# Patient Record
Sex: FEMALE | Race: ASIAN | Employment: STUDENT | URBAN - METROPOLITAN AREA
[De-identification: names, ages, dates, MRNs, and addresses within clinical notes are randomized per-mention and may not be internally consistent; named-entity substitution may affect disease eponyms.]

---
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
## 2018-01-03 PROBLEM — M24.521 CONTRACTURE OF RIGHT ELBOW: Status: ACTIVE | Noted: 2018-01-03

## 2018-02-04 ENCOUNTER — HOSPITAL ENCOUNTER (EMERGENCY)
Facility: HOSPITAL | Age: 18
Discharge: HOME | End: 2018-02-04
Attending: EMERGENCY MEDICINE
Payer: COMMERCIAL

## 2018-02-04 VITALS
BODY MASS INDEX: 19 KG/M2 | DIASTOLIC BLOOD PRESSURE: 86 MMHG | SYSTOLIC BLOOD PRESSURE: 102 MMHG | HEART RATE: 86 BPM | WEIGHT: 105.38 LBS | OXYGEN SATURATION: 98 % | TEMPERATURE: 97 F | RESPIRATION RATE: 16 BRPM

## 2018-02-04 DIAGNOSIS — L30.9 ECZEMA, UNSPECIFIED TYPE: ICD-10-CM

## 2018-02-04 DIAGNOSIS — R21 RASH AND NONSPECIFIC SKIN ERUPTION: Primary | ICD-10-CM

## 2018-02-04 PROCEDURE — 99283 EMERGENCY DEPT VISIT LOW MDM: CPT

## 2018-02-04 RX ORDER — NAPROXEN 500 MG/1
500 TABLET ORAL
COMMUNITY
End: 2018-06-06

## 2018-02-04 RX ORDER — CEPHALEXIN 500 MG/1
500 CAPSULE ORAL 3 TIMES DAILY
Qty: 30 CAPSULE | Refills: 0 | Status: SHIPPED | OUTPATIENT
Start: 2018-02-04 | End: 2018-02-14

## 2018-02-04 NOTE — ED INITIAL ASSESSMENT (HPI)
Itchy rash started yesterday to right arm, spreading to right side of neck post right elbow synovectomy and contracture on January 25. Per patient, incision sites have itched since day of procedure.

## 2018-02-04 NOTE — ED INITIAL ASSESSMENT (HPI)
Itchy rash started yesterday to right arm, up to right side of neck. Had procedure Jan 25 on right arm - incision sites have itched since day of procedure.

## 2018-02-05 NOTE — ED PROVIDER NOTES
Patient Seen in: [facility] Emergency Department    History   Patient presents with:  Rash Skin Problem (integumentary)    Stated Complaint: rash around surgical site- right elbow arthroscopy    HPI    Paris Ours is a 57-year-old with history of juvenil NECK: Neck is supple. CHEST: Patient is breathing comfortably. Lungs are clear to auscultation bilaterally. No wheezes, rhonchi or rales. HEART: Regular rate and rhythm, S1-S2, no rubs or murmurs.   ABDOMEN: Soft, nontender, nondistended with good b as soon as possible for a visit in 2 days  If symptoms worsen        Medications Prescribed:  Discharge Medication List as of 2/4/2018 12:49 PM    START taking these medications    cephALEXin 500 MG Oral Cap  Take 1 capsule (500 mg total) by mouth 3 (three

## 2018-02-07 ENCOUNTER — HOSPITAL ENCOUNTER (OUTPATIENT)
Dept: PHYSICAL THERAPY | Facility: HOSPITAL | Age: 18
Setting detail: THERAPIES SERIES
Discharge: HOME | End: 2018-02-07
Attending: PHYSICIAN ASSISTANT
Payer: COMMERCIAL

## 2018-02-07 DIAGNOSIS — M25.621 ELBOW STIFFNESS, RIGHT: ICD-10-CM

## 2018-02-07 DIAGNOSIS — M24.521 CONTRACTURE OF RIGHT ELBOW: ICD-10-CM

## 2018-02-07 DIAGNOSIS — M05.79 RHEUMATOID ARTHRITIS INVOLVING MULTIPLE SITES WITH POSITIVE RHEUMATOID FACTOR (HCC): ICD-10-CM

## 2018-02-07 PROCEDURE — 97110 THERAPEUTIC EXERCISES: CPT

## 2018-02-07 PROCEDURE — 97162 PT EVAL MOD COMPLEX 30 MIN: CPT

## 2018-02-07 NOTE — PROGRESS NOTES
UPPER EXTREMITY EVALUATION:   Referring Physician: Dr. Tala Escobedo  Diagnosis: Contracture of R elbow Date of Service: 2/7/2018     PATIENT SUMMARY   Spencer Brady is a 21year old y/o female who presents to therapy today with complaints of R elbow ROM [place] use LUE for these activities. Patient has been unable to drive since 17 due to RUE ROM limitations. Patient currently is a college student.   Pt goals include improving R elbow flexion ROM to assist with dressing, showering, eating, writing, lifting objec 60, L 30  Pronation: R 45, L 60    *pain at end range in medial and posterior elbow Flexion: R 80; L 55*  Extension: R 43; L 10**  Radial deviation: R 15; L 3*  Ulnar deviation: R 65; L 40**  *pain dorsal surface wrist  **pain volar surface wrist     PROM: Therapeutic Exercises; Neuromuscular Re-education; Therapeutic Activity; Electrical Stim; Ultrasound;  Pt education; Home exercise program instructions;     Education or treatment limitation: None  Rehab Potential:good    FOTO: 44/100    Patient/Family/Care

## 2018-02-12 ENCOUNTER — HOSPITAL ENCOUNTER (OUTPATIENT)
Dept: PHYSICAL THERAPY | Facility: HOSPITAL | Age: 18
Setting detail: THERAPIES SERIES
Discharge: HOME | End: 2018-02-12
Attending: PHYSICIAN ASSISTANT
Payer: COMMERCIAL

## 2018-02-12 PROCEDURE — 97140 MANUAL THERAPY 1/> REGIONS: CPT

## 2018-02-12 PROCEDURE — 97110 THERAPEUTIC EXERCISES: CPT

## 2018-02-12 NOTE — PROGRESS NOTES
Dx: Contracture of right elbow         Authorized # of Visits:  8         Next MD visit: none scheduled  Fall Risk: standard         Precautions: n/a             Subjective: Patient complains no no R elbow pain at rest.  States that the exercises are going (10 visits)  -Pt will demonstrate understanding, knowledge, and independence in current and future HEP (10 visits)    Plan: Continue current PT plan of care 2x/week to address R elbow A/PROM deficits, gradually start increasing R elbow strength as tolerate

## 2018-02-14 ENCOUNTER — HOSPITAL ENCOUNTER (OUTPATIENT)
Dept: PHYSICAL THERAPY | Facility: HOSPITAL | Age: 18
Setting detail: THERAPIES SERIES
Discharge: HOME | End: 2018-02-14
Attending: PHYSICIAN ASSISTANT
Payer: COMMERCIAL

## 2018-02-14 PROCEDURE — 97530 THERAPEUTIC ACTIVITIES: CPT

## 2018-02-14 PROCEDURE — 97140 MANUAL THERAPY 1/> REGIONS: CPT

## 2018-02-14 NOTE — PROGRESS NOTES
Dx: Contracture of right elbow         Authorized # of Visits:  8         Next MD visit: none scheduled  Fall Risk: standard         Precautions: n/a             Subjective: Anna  ent complains of 6/10 pain in L wrist.  States that she lifted 2# weights ye understanding, knowledge, and independence in current and future HEP (10 visits)    Plan: Continue current PT plan of care 2x/week to address R elbow A/PROM deficits, gradually start increasing R elbow strength as tolerated, and pain management manual/moda

## 2018-02-21 ENCOUNTER — HOSPITAL ENCOUNTER (OUTPATIENT)
Dept: PHYSICAL THERAPY | Facility: HOSPITAL | Age: 18
Setting detail: THERAPIES SERIES
Discharge: HOME | End: 2018-02-21
Attending: PHYSICIAN ASSISTANT
Payer: COMMERCIAL

## 2018-02-21 PROCEDURE — 97140 MANUAL THERAPY 1/> REGIONS: CPT

## 2018-02-21 NOTE — PROGRESS NOTES
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

## 2018-02-26 ENCOUNTER — HOSPITAL ENCOUNTER (OUTPATIENT)
Dept: PHYSICAL THERAPY | Facility: HOSPITAL | Age: 18
Setting detail: THERAPIES SERIES
Discharge: HOME | End: 2018-02-26
Attending: PHYSICIAN ASSISTANT
Payer: COMMERCIAL

## 2018-02-26 PROCEDURE — 97140 MANUAL THERAPY 1/> REGIONS: CPT

## 2018-02-26 PROCEDURE — 97110 THERAPEUTIC EXERCISES: CPT

## 2018-02-26 NOTE — PROGRESS NOTES
Dx: Contracture of right elbow         Authorized # of Visits:  8         Next MD visit: none scheduled  Fall Risk: standard         Precautions: n/a             Subjective: Patient states that she was sore in her R shoulder after last session due to the p with turning a door knob (10 visits)  -Pt will demonstrate improved R elbow extension AROM to at least 15 degrees in order to reach for objects above her head (10 visits)  -Pt will demonstrate improved R elbow flexion AROM to at least 130 degrees in order distraction technique- x4 min, R R elbow flexion AROM w/ self end-range stretch applied-2x10      Posterior/anterior humeroradial mobilizations- R, grade III, 3 min Patient putting on coat, placing LUE into sleeve first- x10 Posterior/anterior humeroradial

## 2018-02-28 ENCOUNTER — APPOINTMENT (OUTPATIENT)
Dept: PHYSICAL THERAPY | Facility: HOSPITAL | Age: 18
End: 2018-02-28
Attending: PHYSICIAN ASSISTANT
Payer: COMMERCIAL

## 2018-03-05 ENCOUNTER — APPOINTMENT (OUTPATIENT)
Dept: PHYSICAL THERAPY | Facility: HOSPITAL | Age: 18
End: 2018-03-05
Attending: PHYSICIAN ASSISTANT
Payer: COMMERCIAL

## 2018-03-07 ENCOUNTER — HOSPITAL ENCOUNTER (OUTPATIENT)
Dept: OCCUPATIONAL MEDICINE | Facility: HOSPITAL | Age: 18
Setting detail: THERAPIES SERIES
End: 2018-03-07
Attending: ORTHOPAEDIC SURGERY
Payer: COMMERCIAL

## 2018-03-07 ENCOUNTER — HOSPITAL ENCOUNTER (OUTPATIENT)
Dept: PHYSICAL THERAPY | Facility: HOSPITAL | Age: 18
Setting detail: THERAPIES SERIES
Discharge: HOME | End: 2018-03-07
Attending: PHYSICIAN ASSISTANT
Payer: COMMERCIAL

## 2018-03-07 PROCEDURE — 97140 MANUAL THERAPY 1/> REGIONS: CPT

## 2018-03-07 PROCEDURE — 97110 THERAPEUTIC EXERCISES: CPT

## 2018-03-07 NOTE — PROGRESS NOTES
Dx: Contracture of right elbow         Authorized # of Visits:  8         Next MD visit: none scheduled  Fall Risk: standard         Precautions: n/a             Subjective: Patient states that she has her first day at work Friday.   If scheduling more visi a coat on (10 visits)  -Pt will demonstrate understanding, knowledge, and independence in current and future HEP (10 visits)    Plan: Continue PT plan of care 2x/week to increase R elbow flexion/extension ROM.   Assess how well night splint worked for next end-range stretch applied-2x10 Posterior/anterior humeroulnar mobilizations-grade III, R- 2 min     Posterior/anterior humeroradial mobilizations- R, grade III, 3 min Patient putting on coat, placing LUE into sleeve first- x10 Posterior/anterior humeroradi

## 2018-03-08 ENCOUNTER — TELEPHONE (OUTPATIENT)
Dept: PHYSICAL THERAPY | Facility: HOSPITAL | Age: 18
End: 2018-03-08

## 2018-03-12 ENCOUNTER — HOSPITAL ENCOUNTER (OUTPATIENT)
Dept: PHYSICAL THERAPY | Facility: HOSPITAL | Age: 18
Setting detail: THERAPIES SERIES
Discharge: HOME | End: 2018-03-12
Attending: PHYSICIAN ASSISTANT
Payer: COMMERCIAL

## 2018-03-12 PROCEDURE — 97110 THERAPEUTIC EXERCISES: CPT

## 2018-03-12 PROCEDURE — 97035 APP MDLTY 1+ULTRASOUND EA 15: CPT

## 2018-03-12 PROCEDURE — 97140 MANUAL THERAPY 1/> REGIONS: CPT

## 2018-03-12 NOTE — ADDENDUM NOTE
Encounter addended by: Cyndee Wray PT on: 3/12/2018  6:06 PM<BR>    Actions taken: Letter status changed

## 2018-03-12 NOTE — ADDENDUM NOTE
Encounter addended by: Katherine Peoples, PT on: 3/12/2018  6:02 PM<BR>    Actions taken: Letter status changed

## 2018-03-12 NOTE — PROGRESS NOTES
Dx: Contracture of right elbow         Authorized # of Visits:  8         Next MD visit: none scheduled  Fall Risk: standard         Precautions: n/a             Subjective: Patient states that she has not worn the static splint at night since last therapy degrees in order to bring food to mouth(10 visits)  -Pt will demonstrate improved R elbow supination to at least 70 degrees to assist with turning a door knob (10 visits)  -Pt will demonstrate improved R elbow extension AROM to at least 15 degrees in order R flexion/extension AROM- 2x20 up/down R pronation/supination stretch- x5 min R extension PROM stretch w/ heat applied to anterior elbow- x10 min total Supine R supination AROM-x20 w/ stretch    Posterior/anterior humeroulnar mobilizations-grade III, R- 3 Manual (x2), Therapeutic Exercise (x1), Ultrasound (x1) Total Timed Treatment: 54 min Total Treatment Time: 57 min

## 2018-03-13 ENCOUNTER — TELEPHONE (OUTPATIENT)
Dept: PHYSICAL THERAPY | Facility: HOSPITAL | Age: 18
End: 2018-03-13

## 2018-03-14 ENCOUNTER — APPOINTMENT (OUTPATIENT)
Dept: PHYSICAL THERAPY | Facility: HOSPITAL | Age: 18
End: 2018-03-14
Attending: PHYSICIAN ASSISTANT
Payer: COMMERCIAL

## 2018-05-22 ENCOUNTER — APPOINTMENT (OUTPATIENT)
Dept: PHYSICAL THERAPY | Facility: HOSPITAL | Age: 18
End: 2018-05-22
Attending: PHYSICIAN ASSISTANT

## 2018-05-23 ENCOUNTER — LABORATORY ENCOUNTER (OUTPATIENT)
Dept: LAB | Age: 18
End: 2018-05-23
Attending: INTERNAL MEDICINE
Payer: COMMERCIAL

## 2018-05-23 DIAGNOSIS — L65.9 HAIR LOSS: ICD-10-CM

## 2018-05-23 DIAGNOSIS — M25.421 SWELLING OF RIGHT ELBOW: ICD-10-CM

## 2018-05-23 DIAGNOSIS — M05.79 RHEUMATOID ARTHRITIS INVOLVING MULTIPLE SITES WITH POSITIVE RHEUMATOID FACTOR (HCC): ICD-10-CM

## 2018-05-23 DIAGNOSIS — R70.0 ELEVATED SED RATE: ICD-10-CM

## 2018-05-23 DIAGNOSIS — R76.8 POSITIVE ANA (ANTINUCLEAR ANTIBODY): ICD-10-CM

## 2018-05-23 DIAGNOSIS — R53.83 OTHER FATIGUE: ICD-10-CM

## 2018-05-23 DIAGNOSIS — M25.432 WRIST SWELLING, LEFT: ICD-10-CM

## 2018-05-23 PROCEDURE — 36415 COLL VENOUS BLD VENIPUNCTURE: CPT

## 2018-05-23 PROCEDURE — 86140 C-REACTIVE PROTEIN: CPT

## 2018-05-23 PROCEDURE — 80076 HEPATIC FUNCTION PANEL: CPT

## 2018-05-23 PROCEDURE — 86225 DNA ANTIBODY NATIVE: CPT

## 2018-05-23 PROCEDURE — 85025 COMPLETE CBC W/AUTO DIFF WBC: CPT

## 2018-05-23 PROCEDURE — 84520 ASSAY OF UREA NITROGEN: CPT

## 2018-05-23 PROCEDURE — 82565 ASSAY OF CREATININE: CPT

## 2018-05-23 PROCEDURE — 85652 RBC SED RATE AUTOMATED: CPT

## 2018-05-24 ENCOUNTER — APPOINTMENT (OUTPATIENT)
Dept: PHYSICAL THERAPY | Facility: HOSPITAL | Age: 18
End: 2018-05-24
Attending: PHYSICIAN ASSISTANT

## 2018-05-24 NOTE — PROGRESS NOTES
Call pt  Sed rate is now normal/ better and only mild rise in CRP; good  Other laboratory testing is within acceptable monitoring ranges, continue current treatment

## 2018-05-29 ENCOUNTER — APPOINTMENT (OUTPATIENT)
Dept: PHYSICAL THERAPY | Facility: HOSPITAL | Age: 18
End: 2018-05-29
Attending: PHYSICIAN ASSISTANT

## 2018-05-31 ENCOUNTER — APPOINTMENT (OUTPATIENT)
Dept: PHYSICAL THERAPY | Facility: HOSPITAL | Age: 18
End: 2018-05-31
Attending: PHYSICIAN ASSISTANT

## 2018-06-05 ENCOUNTER — APPOINTMENT (OUTPATIENT)
Dept: PHYSICAL THERAPY | Facility: HOSPITAL | Age: 18
End: 2018-06-05
Attending: PHYSICIAN ASSISTANT

## 2018-06-06 ENCOUNTER — HOSPITAL ENCOUNTER (OUTPATIENT)
Age: 18
Discharge: HOME | End: 2018-06-06
Attending: FAMILY MEDICINE
Payer: OTHER MISCELLANEOUS

## 2018-06-06 ENCOUNTER — APPOINTMENT (OUTPATIENT)
Dept: GENERAL RADIOLOGY | Age: 18
End: 2018-06-06
Attending: FAMILY MEDICINE
Payer: OTHER MISCELLANEOUS

## 2018-06-06 VITALS
SYSTOLIC BLOOD PRESSURE: 104 MMHG | TEMPERATURE: 98 F | RESPIRATION RATE: 18 BRPM | OXYGEN SATURATION: 100 % | DIASTOLIC BLOOD PRESSURE: 62 MMHG | HEART RATE: 71 BPM

## 2018-06-06 DIAGNOSIS — S49.91XA INJURY OF RIGHT SHOULDER, INITIAL ENCOUNTER: ICD-10-CM

## 2018-06-06 DIAGNOSIS — S46.911A STRAIN OF RIGHT SHOULDER, INITIAL ENCOUNTER: Primary | ICD-10-CM

## 2018-06-06 PROCEDURE — 73030 X-RAY EXAM OF SHOULDER: CPT | Performed by: FAMILY MEDICINE

## 2018-06-06 PROCEDURE — 99213 OFFICE O/P EST LOW 20 MIN: CPT

## 2018-06-06 PROCEDURE — 99203 OFFICE O/P NEW LOW 30 MIN: CPT

## 2018-06-07 ENCOUNTER — APPOINTMENT (OUTPATIENT)
Dept: PHYSICAL THERAPY | Facility: HOSPITAL | Age: 18
End: 2018-06-07
Attending: PHYSICIAN ASSISTANT

## 2018-06-07 NOTE — ED PROVIDER NOTES
Patient Seen in: [facility]    History   Patient presents with:  Upper Extremity Injury (musculoskeletal)    Stated Complaint: RT SHOULDER INJURY-WC    HPI    This 68-year-old female who is right-hand dominant presents the offic anicteric,  conjunctiva normal.  EARS: Tympanic membranes normal, EAC's normal.  NOSE: Turbinates normal, no bleeding noted. PHARYNX:  No eythema or exudates, tonsils normal size, airway patent, uvula midline  NECK:  No cervical lymphadenopathy.  No thyrom x-ray with the patient. There is no evidence for fracture dislocation. She has significant tenderness into the deltoid muscle which I believe she has strained. Symptomatic treatment is reviewed.   She is to stay on all of her normal prescription medicati

## 2018-06-07 NOTE — ED INITIAL ASSESSMENT (HPI)
Pt pulled her right shoulder while washing dishes today at work today. She works for [employer].

## 2018-06-12 ENCOUNTER — APPOINTMENT (OUTPATIENT)
Dept: PHYSICAL THERAPY | Facility: HOSPITAL | Age: 18
End: 2018-06-12
Attending: PHYSICIAN ASSISTANT

## 2018-06-14 ENCOUNTER — APPOINTMENT (OUTPATIENT)
Dept: PHYSICAL THERAPY | Facility: HOSPITAL | Age: 18
End: 2018-06-14
Attending: PHYSICIAN ASSISTANT

## 2018-06-21 ENCOUNTER — APPOINTMENT (OUTPATIENT)
Dept: PHYSICAL THERAPY | Facility: HOSPITAL | Age: 18
End: 2018-06-21
Attending: PHYSICIAN ASSISTANT

## 2018-06-28 ENCOUNTER — APPOINTMENT (OUTPATIENT)
Dept: PHYSICAL THERAPY | Facility: HOSPITAL | Age: 18
End: 2018-06-28
Attending: PHYSICIAN ASSISTANT

## 2018-07-05 ENCOUNTER — APPOINTMENT (OUTPATIENT)
Dept: PHYSICAL THERAPY | Facility: HOSPITAL | Age: 18
End: 2018-07-05
Attending: PHYSICIAN ASSISTANT

## 2018-07-26 ENCOUNTER — APPOINTMENT (OUTPATIENT)
Dept: OCCUPATIONAL MEDICINE | Facility: HOSPITAL | Age: 18
End: 2018-07-26
Attending: ORTHOPAEDIC SURGERY

## 2018-08-02 ENCOUNTER — APPOINTMENT (OUTPATIENT)
Dept: OCCUPATIONAL MEDICINE | Facility: HOSPITAL | Age: 18
End: 2018-08-02
Attending: ORTHOPAEDIC SURGERY
Payer: COMMERCIAL

## 2018-08-09 ENCOUNTER — HOSPITAL ENCOUNTER (OUTPATIENT)
Dept: OCCUPATIONAL MEDICINE | Facility: HOSPITAL | Age: 18
Setting detail: THERAPIES SERIES
Discharge: HOME | End: 2018-08-09
Attending: ORTHOPAEDIC SURGERY
Payer: COMMERCIAL

## 2018-08-09 PROCEDURE — 97165 OT EVAL LOW COMPLEX 30 MIN: CPT

## 2018-08-09 PROCEDURE — 97110 THERAPEUTIC EXERCISES: CPT

## 2018-08-09 NOTE — PROGRESS NOTES
OCCUPATIONAL THERAPY UPPER EXTREMITY EVALUATION   Referring Physician: Dr. Ruth Ann Cornell  Diagnosis:   Contracture of right elbow (F98.501)   Date of Service: 8/9/2018     PATIENT SUMMARY   Spencer Brady is a 21year old y/o female who presents to therapy today too much time to complete so finds appropriate clothing   w/o buttons. 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   * Therapist and Pt agree to remain on Hold Status , perform all Home exercises and return for monitoring of BUE AROM and strength when she Treatment will include: Manual Therapy, Therapeutic Exercise, Therapeutic Activity, Ultrasound, Whirlpool (fluidotherapy),  Splinting, Neuromuscular Re-education,     Education or treatment limitation: none  Rehab Potential:good    Pt did not take FOTO for

## 2018-08-16 ENCOUNTER — APPOINTMENT (OUTPATIENT)
Dept: OCCUPATIONAL MEDICINE | Facility: HOSPITAL | Age: 18
End: 2018-08-16
Attending: ORTHOPAEDIC SURGERY
Payer: COMMERCIAL

## 2018-08-23 ENCOUNTER — APPOINTMENT (OUTPATIENT)
Dept: OCCUPATIONAL MEDICINE | Facility: HOSPITAL | Age: 18
End: 2018-08-23
Attending: ORTHOPAEDIC SURGERY
Payer: COMMERCIAL

## 2018-08-30 ENCOUNTER — APPOINTMENT (OUTPATIENT)
Dept: OCCUPATIONAL MEDICINE | Facility: HOSPITAL | Age: 18
End: 2018-08-30
Attending: ORTHOPAEDIC SURGERY
Payer: COMMERCIAL

## 2018-10-29 ENCOUNTER — TELEPHONE (OUTPATIENT)
Dept: FAMILY MEDICINE CLINIC | Facility: CLINIC | Age: 18
End: 2018-10-29

## 2018-10-29 DIAGNOSIS — M05.79 RHEUMATOID ARTHRITIS INVOLVING MULTIPLE SITES WITH POSITIVE RHEUMATOID FACTOR (HCC): Primary | ICD-10-CM

## 2018-11-22 ENCOUNTER — HOSPITAL ENCOUNTER (EMERGENCY)
Facility: HOSPITAL | Age: 18
Discharge: HOME | End: 2018-11-22
Attending: EMERGENCY MEDICINE
Payer: COMMERCIAL

## 2018-11-22 VITALS
DIASTOLIC BLOOD PRESSURE: 68 MMHG | RESPIRATION RATE: 16 BRPM | SYSTOLIC BLOOD PRESSURE: 113 MMHG | OXYGEN SATURATION: 99 % | WEIGHT: 95 LBS | BODY MASS INDEX: 17 KG/M2 | TEMPERATURE: 98 F | HEART RATE: 91 BPM

## 2018-11-22 DIAGNOSIS — H65.02 ACUTE SEROUS OTITIS MEDIA OF LEFT EAR, RECURRENCE NOT SPECIFIED: Primary | ICD-10-CM

## 2018-11-22 DIAGNOSIS — M05.9 RHEUMATOID ARTHRITIS WITH POSITIVE RHEUMATOID FACTOR, INVOLVING UNSPECIFIED SITE (HCC): ICD-10-CM

## 2018-11-22 PROCEDURE — 99283 EMERGENCY DEPT VISIT LOW MDM: CPT

## 2018-11-22 RX ORDER — AZITHROMYCIN 250 MG/1
TABLET, FILM COATED ORAL
Qty: 1 PACKAGE | Refills: 0 | Status: SHIPPED | OUTPATIENT
Start: 2018-11-22 | End: 2019-04-04 | Stop reason: ALTCHOICE

## 2018-11-22 NOTE — ED PROVIDER NOTES
Patient Seen in: [facility] Emergency Department    History   Patient presents with:  Ear Problem Pain (neurosensory)    Stated Complaint: left ear pain and pressure    HPI    This is a 17-year-old female with a medical history of rheumatoid arthrit without murmur, rub, or gallop. S1 and S2 are normal.  ABDOMEN: Normoactive bowel sounds, no tenderness to palpation, no hepatosplenomegaly or masses. EXTREMITIES: Capillary refill time is normal without cyanosis, clubbing, or edema.   SKIN EXAM: There ar

## 2018-11-23 ENCOUNTER — APPOINTMENT (OUTPATIENT)
Dept: OCCUPATIONAL MEDICINE | Facility: HOSPITAL | Age: 18
End: 2018-11-23
Attending: ORTHOPAEDIC SURGERY
Payer: COMMERCIAL

## 2018-11-30 ENCOUNTER — LAB ENCOUNTER (OUTPATIENT)
Dept: LAB | Age: 18
End: 2018-11-30
Attending: INTERNAL MEDICINE
Payer: COMMERCIAL

## 2018-11-30 DIAGNOSIS — Z79.899 LONG-TERM USE OF HIGH-RISK MEDICATION: ICD-10-CM

## 2018-11-30 DIAGNOSIS — M06.9 RHEUMATOID ARTHRITIS INVOLVING MULTIPLE SITES, UNSPECIFIED RHEUMATOID FACTOR PRESENCE: ICD-10-CM

## 2018-11-30 PROCEDURE — 36415 COLL VENOUS BLD VENIPUNCTURE: CPT

## 2018-11-30 PROCEDURE — 86140 C-REACTIVE PROTEIN: CPT

## 2018-11-30 PROCEDURE — 82565 ASSAY OF CREATININE: CPT

## 2018-11-30 PROCEDURE — 86225 DNA ANTIBODY NATIVE: CPT

## 2018-11-30 PROCEDURE — 80076 HEPATIC FUNCTION PANEL: CPT

## 2018-11-30 PROCEDURE — 85652 RBC SED RATE AUTOMATED: CPT

## 2018-11-30 PROCEDURE — 85025 COMPLETE CBC W/AUTO DIFF WBC: CPT

## 2018-11-30 PROCEDURE — 84520 ASSAY OF UREA NITROGEN: CPT

## 2018-12-03 NOTE — PROGRESS NOTES
Call pt  Laboratory testing is within acceptable monitoring ranges, continue current treatment  May pend any Rx she now needs

## 2018-12-17 ENCOUNTER — LAB ENCOUNTER (OUTPATIENT)
Dept: LAB | Age: 18
End: 2018-12-17
Attending: INTERNAL MEDICINE
Payer: COMMERCIAL

## 2018-12-17 DIAGNOSIS — R70.0 ELEVATED SED RATE: ICD-10-CM

## 2018-12-17 PROCEDURE — 85025 COMPLETE CBC W/AUTO DIFF WBC: CPT

## 2018-12-17 PROCEDURE — 36415 COLL VENOUS BLD VENIPUNCTURE: CPT

## 2018-12-17 PROCEDURE — 85652 RBC SED RATE AUTOMATED: CPT

## 2019-04-10 ENCOUNTER — LAB ENCOUNTER (OUTPATIENT)
Dept: LAB | Age: 19
End: 2019-04-10
Attending: INTERNAL MEDICINE
Payer: COMMERCIAL

## 2019-04-10 DIAGNOSIS — Z79.899 LONG-TERM USE OF IMMUNOSUPPRESSANT MEDICATION: ICD-10-CM

## 2019-04-10 DIAGNOSIS — R70.0 ELEVATED SED RATE: ICD-10-CM

## 2019-04-10 DIAGNOSIS — M05.79 RHEUMATOID ARTHRITIS INVOLVING MULTIPLE SITES WITH POSITIVE RHEUMATOID FACTOR (HCC): ICD-10-CM

## 2019-04-10 DIAGNOSIS — M25.421 ELBOW SWELLING, RIGHT: ICD-10-CM

## 2019-04-10 DIAGNOSIS — Z79.899 LONG-TERM USE OF HIGH-RISK MEDICATION: ICD-10-CM

## 2019-04-10 PROCEDURE — 86225 DNA ANTIBODY NATIVE: CPT

## 2019-04-10 PROCEDURE — 82565 ASSAY OF CREATININE: CPT

## 2019-04-10 PROCEDURE — 84520 ASSAY OF UREA NITROGEN: CPT

## 2019-04-10 PROCEDURE — 86160 COMPLEMENT ANTIGEN: CPT

## 2019-04-10 PROCEDURE — 36415 COLL VENOUS BLD VENIPUNCTURE: CPT

## 2019-04-10 PROCEDURE — 85652 RBC SED RATE AUTOMATED: CPT

## 2019-04-10 PROCEDURE — 85025 COMPLETE CBC W/AUTO DIFF WBC: CPT

## 2019-04-10 PROCEDURE — 81001 URINALYSIS AUTO W/SCOPE: CPT

## 2019-04-10 PROCEDURE — 86140 C-REACTIVE PROTEIN: CPT

## 2019-04-10 PROCEDURE — 80076 HEPATIC FUNCTION PANEL: CPT

## 2019-04-11 NOTE — PROGRESS NOTES
Call pt  Laboratory testing is within acceptable monitoring ranges, continue current treatment  Urine; no protein no blood  Rx is filled

## 2019-06-26 ENCOUNTER — OFFICE VISIT (OUTPATIENT)
Dept: FAMILY MEDICINE CLINIC | Facility: CLINIC | Age: 19
End: 2019-06-26

## 2019-06-26 VITALS
OXYGEN SATURATION: 98 % | TEMPERATURE: 98 F | RESPIRATION RATE: 16 BRPM | BODY MASS INDEX: 18.66 KG/M2 | HEART RATE: 64 BPM | WEIGHT: 101.38 LBS | SYSTOLIC BLOOD PRESSURE: 110 MMHG | DIASTOLIC BLOOD PRESSURE: 68 MMHG | HEIGHT: 62 IN

## 2019-06-26 DIAGNOSIS — K01.1 TOOTH IMPACTION: Primary | ICD-10-CM

## 2019-06-26 PROCEDURE — 99212 OFFICE O/P EST SF 10 MIN: CPT | Performed by: PHYSICIAN ASSISTANT

## 2019-06-26 NOTE — PROGRESS NOTES
HPI:    Patient ID: Brant Kang is a 19year old female. HPI   Patient presents today requesting refill to oral/dental surgeon. [facility] and was advised to undergo wisdom tooth extraction.   She was referred to so of the defined types were placed in this encounter.       Meds This Visit:  Requested Prescriptions      No prescriptions requested or ordered in this encounter       Imaging & Referrals:  ORAL AND MAXILLOFACIAL SURGERY - INTERNAL         VZ#[id number]

## 2019-07-23 ENCOUNTER — LAB ENCOUNTER (OUTPATIENT)
Dept: LAB | Age: 19
End: 2019-07-23
Attending: INTERNAL MEDICINE
Payer: COMMERCIAL

## 2019-07-23 DIAGNOSIS — R76.8 POSITIVE ANA (ANTINUCLEAR ANTIBODY): ICD-10-CM

## 2019-07-23 DIAGNOSIS — Z79.899 LONG-TERM USE OF HIGH-RISK MEDICATION: ICD-10-CM

## 2019-07-23 LAB
ALBUMIN SERPL-MCNC: 4.1 G/DL (ref 3.4–5)
ALP LIVER SERPL-CCNC: 70 U/L (ref 52–144)
ALT SERPL-CCNC: 20 U/L (ref 13–56)
AST SERPL-CCNC: 21 U/L (ref 15–37)
BASOPHILS # BLD AUTO: 0.01 X10(3) UL (ref 0–0.2)
BASOPHILS NFR BLD AUTO: 0.2 %
BILIRUB DIRECT SERPL-MCNC: 0.1 MG/DL (ref 0–0.2)
BILIRUB SERPL-MCNC: 0.6 MG/DL (ref 0.1–2)
BILIRUB UR QL STRIP.AUTO: NEGATIVE
BUN BLD-MCNC: 14 MG/DL (ref 7–18)
C3 SERPL-MCNC: 124 MG/DL (ref 90–180)
C4 SERPL-MCNC: 24.4 MG/DL (ref 10–40)
CLARITY UR REFRACT.AUTO: CLEAR
COLOR UR AUTO: YELLOW
CREAT BLD-MCNC: 0.64 MG/DL (ref 0.55–1.02)
CRP SERPL-MCNC: <0.29 MG/DL (ref ?–0.3)
DEPRECATED RDW RBC AUTO: 45.5 FL (ref 35.1–46.3)
EOSINOPHIL # BLD AUTO: 0.04 X10(3) UL (ref 0–0.7)
EOSINOPHIL NFR BLD AUTO: 0.7 %
ERYTHROCYTE [DISTWIDTH] IN BLOOD BY AUTOMATED COUNT: 14 % (ref 11–15)
GLUCOSE UR STRIP.AUTO-MCNC: NEGATIVE MG/DL
HCT VFR BLD AUTO: 38.7 % (ref 35–48)
HGB BLD-MCNC: 12.5 G/DL (ref 12–16)
IMM GRANULOCYTES # BLD AUTO: 0.01 X10(3) UL (ref 0–1)
IMM GRANULOCYTES NFR BLD: 0.2 %
KETONES UR STRIP.AUTO-MCNC: NEGATIVE MG/DL
LEUKOCYTE ESTERASE UR QL STRIP.AUTO: NEGATIVE
LYMPHOCYTES # BLD AUTO: 2.5 X10(3) UL (ref 1–4)
LYMPHOCYTES NFR BLD AUTO: 41.7 %
M PROTEIN MFR SERPL ELPH: 7.9 G/DL (ref 6.4–8.2)
MCH RBC QN AUTO: 28.8 PG (ref 26–34)
MCHC RBC AUTO-ENTMCNC: 32.3 G/DL (ref 31–37)
MCV RBC AUTO: 89.2 FL (ref 80–100)
MONOCYTES # BLD AUTO: 0.24 X10(3) UL (ref 0.1–1)
MONOCYTES NFR BLD AUTO: 4 %
NEUTROPHILS # BLD AUTO: 3.2 X10 (3) UL (ref 1.5–7.7)
NEUTROPHILS # BLD AUTO: 3.2 X10(3) UL (ref 1.5–7.7)
NEUTROPHILS NFR BLD AUTO: 53.2 %
NITRITE UR QL STRIP.AUTO: NEGATIVE
PH UR STRIP.AUTO: 5 [PH] (ref 4.5–8)
PLATELET # BLD AUTO: 248 10(3)UL (ref 150–450)
PROT UR STRIP.AUTO-MCNC: NEGATIVE MG/DL
RBC # BLD AUTO: 4.34 X10(6)UL (ref 3.8–5.3)
RBC UR QL AUTO: NEGATIVE
SED RATE-ML: 11 MM/HR (ref 0–25)
SP GR UR STRIP.AUTO: 1.02 (ref 1–1.03)
UROBILINOGEN UR STRIP.AUTO-MCNC: <2 MG/DL
WBC # BLD AUTO: 6 X10(3) UL (ref 4–11)

## 2019-07-23 PROCEDURE — 85025 COMPLETE CBC W/AUTO DIFF WBC: CPT

## 2019-07-23 PROCEDURE — 80076 HEPATIC FUNCTION PANEL: CPT

## 2019-07-23 PROCEDURE — 86160 COMPLEMENT ANTIGEN: CPT

## 2019-07-23 PROCEDURE — 85652 RBC SED RATE AUTOMATED: CPT

## 2019-07-23 PROCEDURE — 86225 DNA ANTIBODY NATIVE: CPT

## 2019-07-23 PROCEDURE — 86140 C-REACTIVE PROTEIN: CPT

## 2019-07-23 PROCEDURE — 84520 ASSAY OF UREA NITROGEN: CPT

## 2019-07-23 PROCEDURE — 36415 COLL VENOUS BLD VENIPUNCTURE: CPT

## 2019-07-23 PROCEDURE — 81003 URINALYSIS AUTO W/O SCOPE: CPT

## 2019-07-23 PROCEDURE — 82565 ASSAY OF CREATININE: CPT

## 2019-07-24 LAB — DSDNA AUTOAB: <100 IU/ML (ref ?–100)

## 2020-01-08 ENCOUNTER — LAB ENCOUNTER (OUTPATIENT)
Dept: LAB | Age: 20
End: 2020-01-08
Attending: INTERNAL MEDICINE

## 2020-01-08 DIAGNOSIS — M25.421 ELBOW SWELLING, RIGHT: ICD-10-CM

## 2020-01-08 DIAGNOSIS — Z79.899 LONG-TERM USE OF HIGH-RISK MEDICATION: ICD-10-CM

## 2020-01-08 DIAGNOSIS — R70.0 ELEVATED SED RATE: ICD-10-CM

## 2020-01-08 DIAGNOSIS — M05.79 RHEUMATOID ARTHRITIS INVOLVING MULTIPLE SITES WITH POSITIVE RHEUMATOID FACTOR (HCC): ICD-10-CM

## 2020-01-08 LAB
ALBUMIN SERPL-MCNC: 3.6 G/DL (ref 3.4–5)
ALP LIVER SERPL-CCNC: 65 U/L (ref 52–144)
ALT SERPL-CCNC: 27 U/L (ref 13–56)
AST SERPL-CCNC: 21 U/L (ref 15–37)
BASOPHILS # BLD AUTO: 0.02 X10(3) UL (ref 0–0.2)
BASOPHILS NFR BLD AUTO: 0.3 %
BILIRUB DIRECT SERPL-MCNC: <0.1 MG/DL (ref 0–0.2)
BILIRUB SERPL-MCNC: 0.4 MG/DL (ref 0.1–2)
BUN BLD-MCNC: 14 MG/DL (ref 7–18)
CREAT BLD-MCNC: 0.63 MG/DL (ref 0.55–1.02)
CRP SERPL-MCNC: <0.29 MG/DL (ref ?–0.3)
DEPRECATED RDW RBC AUTO: 45.5 FL (ref 35.1–46.3)
EOSINOPHIL # BLD AUTO: 0.09 X10(3) UL (ref 0–0.7)
EOSINOPHIL NFR BLD AUTO: 1.5 %
ERYTHROCYTE [DISTWIDTH] IN BLOOD BY AUTOMATED COUNT: 13.9 % (ref 11–15)
HCT VFR BLD AUTO: 40.9 % (ref 35–48)
HGB BLD-MCNC: 13 G/DL (ref 12–16)
IMM GRANULOCYTES # BLD AUTO: 0.01 X10(3) UL (ref 0–1)
IMM GRANULOCYTES NFR BLD: 0.2 %
LYMPHOCYTES # BLD AUTO: 2.55 X10(3) UL (ref 1–4)
LYMPHOCYTES NFR BLD AUTO: 43.3 %
M PROTEIN MFR SERPL ELPH: 7.4 G/DL (ref 6.4–8.2)
MCH RBC QN AUTO: 28.5 PG (ref 26–34)
MCHC RBC AUTO-ENTMCNC: 31.8 G/DL (ref 31–37)
MCV RBC AUTO: 89.7 FL (ref 80–100)
MONOCYTES # BLD AUTO: 0.25 X10(3) UL (ref 0.1–1)
MONOCYTES NFR BLD AUTO: 4.2 %
NEUTROPHILS # BLD AUTO: 2.97 X10 (3) UL (ref 1.5–7.7)
NEUTROPHILS # BLD AUTO: 2.97 X10(3) UL (ref 1.5–7.7)
NEUTROPHILS NFR BLD AUTO: 50.5 %
PLATELET # BLD AUTO: 232 10(3)UL (ref 150–450)
RBC # BLD AUTO: 4.56 X10(6)UL (ref 3.8–5.3)
SED RATE-ML: 16 MM/HR (ref 0–25)
WBC # BLD AUTO: 5.9 X10(3) UL (ref 4–11)

## 2020-01-08 PROCEDURE — 36415 COLL VENOUS BLD VENIPUNCTURE: CPT

## 2020-01-08 PROCEDURE — 85025 COMPLETE CBC W/AUTO DIFF WBC: CPT

## 2020-01-08 PROCEDURE — 80076 HEPATIC FUNCTION PANEL: CPT

## 2020-01-08 PROCEDURE — 85652 RBC SED RATE AUTOMATED: CPT

## 2020-01-08 PROCEDURE — 82565 ASSAY OF CREATININE: CPT

## 2020-01-08 PROCEDURE — 86140 C-REACTIVE PROTEIN: CPT

## 2020-01-08 PROCEDURE — 84520 ASSAY OF UREA NITROGEN: CPT

## 2020-05-18 ENCOUNTER — LAB ENCOUNTER (OUTPATIENT)
Dept: LAB | Age: 20
End: 2020-05-18
Attending: INTERNAL MEDICINE
Payer: COMMERCIAL

## 2020-05-18 DIAGNOSIS — Z79.899 LONG-TERM USE OF HIGH-RISK MEDICATION: ICD-10-CM

## 2020-05-18 DIAGNOSIS — M05.79 RHEUMATOID ARTHRITIS INVOLVING MULTIPLE SITES WITH POSITIVE RHEUMATOID FACTOR (HCC): ICD-10-CM

## 2020-05-18 PROCEDURE — 80076 HEPATIC FUNCTION PANEL: CPT

## 2020-05-18 PROCEDURE — 36415 COLL VENOUS BLD VENIPUNCTURE: CPT

## 2020-05-18 PROCEDURE — 85652 RBC SED RATE AUTOMATED: CPT

## 2020-05-18 PROCEDURE — 82565 ASSAY OF CREATININE: CPT

## 2020-05-18 PROCEDURE — 85025 COMPLETE CBC W/AUTO DIFF WBC: CPT

## 2020-05-18 PROCEDURE — 86140 C-REACTIVE PROTEIN: CPT

## 2020-05-18 PROCEDURE — 84520 ASSAY OF UREA NITROGEN: CPT

## 2020-08-06 ENCOUNTER — LAB ENCOUNTER (OUTPATIENT)
Dept: LAB | Age: 20
End: 2020-08-06
Attending: INTERNAL MEDICINE
Payer: COMMERCIAL

## 2020-08-06 DIAGNOSIS — M05.79 RHEUMATOID ARTHRITIS INVOLVING MULTIPLE SITES WITH POSITIVE RHEUMATOID FACTOR (HCC): ICD-10-CM

## 2020-08-06 DIAGNOSIS — Z79.899 LONG-TERM USE OF HIGH-RISK MEDICATION: ICD-10-CM

## 2020-08-06 LAB
ALBUMIN SERPL-MCNC: 3.8 G/DL (ref 3.4–5)
ALP LIVER SERPL-CCNC: 68 U/L (ref 52–144)
ALT SERPL-CCNC: 20 U/L (ref 13–56)
AST SERPL-CCNC: 17 U/L (ref 15–37)
BASOPHILS # BLD AUTO: 0.02 X10(3) UL (ref 0–0.2)
BASOPHILS NFR BLD AUTO: 0.4 %
BILIRUB DIRECT SERPL-MCNC: 0.1 MG/DL (ref 0–0.2)
BILIRUB SERPL-MCNC: 0.3 MG/DL (ref 0.1–2)
BUN BLD-MCNC: 15 MG/DL (ref 7–18)
CREAT BLD-MCNC: 0.66 MG/DL (ref 0.55–1.02)
CRP SERPL-MCNC: <0.29 MG/DL (ref ?–0.3)
DEPRECATED RDW RBC AUTO: 48.9 FL (ref 35.1–46.3)
EOSINOPHIL # BLD AUTO: 0.06 X10(3) UL (ref 0–0.7)
EOSINOPHIL NFR BLD AUTO: 1.1 %
ERYTHROCYTE [DISTWIDTH] IN BLOOD BY AUTOMATED COUNT: 14.3 % (ref 11–15)
HCT VFR BLD AUTO: 40.5 % (ref 35–48)
HGB BLD-MCNC: 12.9 G/DL (ref 12–16)
IMM GRANULOCYTES # BLD AUTO: 0.01 X10(3) UL (ref 0–1)
IMM GRANULOCYTES NFR BLD: 0.2 %
LYMPHOCYTES # BLD AUTO: 2.26 X10(3) UL (ref 1–4)
LYMPHOCYTES NFR BLD AUTO: 41.6 %
M PROTEIN MFR SERPL ELPH: 7.7 G/DL (ref 6.4–8.2)
MCH RBC QN AUTO: 29.4 PG (ref 26–34)
MCHC RBC AUTO-ENTMCNC: 31.9 G/DL (ref 31–37)
MCV RBC AUTO: 92.3 FL (ref 80–100)
MONOCYTES # BLD AUTO: 0.38 X10(3) UL (ref 0.1–1)
MONOCYTES NFR BLD AUTO: 7 %
NEUTROPHILS # BLD AUTO: 2.7 X10 (3) UL (ref 1.5–7.7)
NEUTROPHILS # BLD AUTO: 2.7 X10(3) UL (ref 1.5–7.7)
NEUTROPHILS NFR BLD AUTO: 49.7 %
PLATELET # BLD AUTO: 206 10(3)UL (ref 150–450)
RBC # BLD AUTO: 4.39 X10(6)UL (ref 3.8–5.3)
SED RATE-ML: 8 MM/HR (ref 0–25)
WBC # BLD AUTO: 5.4 X10(3) UL (ref 4–11)

## 2020-08-06 PROCEDURE — 86480 TB TEST CELL IMMUN MEASURE: CPT

## 2020-08-06 PROCEDURE — 86256 FLUORESCENT ANTIBODY TITER: CPT

## 2020-08-06 PROCEDURE — 85652 RBC SED RATE AUTOMATED: CPT

## 2020-08-06 PROCEDURE — 84520 ASSAY OF UREA NITROGEN: CPT

## 2020-08-06 PROCEDURE — 86140 C-REACTIVE PROTEIN: CPT

## 2020-08-06 PROCEDURE — 36415 COLL VENOUS BLD VENIPUNCTURE: CPT

## 2020-08-06 PROCEDURE — 85025 COMPLETE CBC W/AUTO DIFF WBC: CPT

## 2020-08-06 PROCEDURE — 82565 ASSAY OF CREATININE: CPT

## 2020-08-06 PROCEDURE — 80076 HEPATIC FUNCTION PANEL: CPT

## 2020-08-13 LAB
M TB IFN-G CD4+ T-CELLS BLD-ACNC: 0.01 IU/ML
M TB TUBERC IFN-G BLD QL: NEGATIVE
M TB TUBERC IGNF/MITOGEN IGNF CONTROL: 6.64 IU/ML
QUANTIFERON TB1 MINUS NIL: 0 IU/ML
QUANTIFERON TB2 MINUS NIL: 0 IU/ML

## 2020-12-07 ENCOUNTER — LAB ENCOUNTER (OUTPATIENT)
Dept: LAB | Age: 20
End: 2020-12-07
Attending: INTERNAL MEDICINE
Payer: COMMERCIAL

## 2020-12-07 DIAGNOSIS — M05.79 RHEUMATOID ARTHRITIS INVOLVING MULTIPLE SITES WITH POSITIVE RHEUMATOID FACTOR (HCC): ICD-10-CM

## 2020-12-07 DIAGNOSIS — Z79.899 LONG-TERM USE OF HIGH-RISK MEDICATION: ICD-10-CM

## 2020-12-07 PROCEDURE — 86160 COMPLEMENT ANTIGEN: CPT

## 2020-12-07 PROCEDURE — 36415 COLL VENOUS BLD VENIPUNCTURE: CPT

## 2020-12-07 PROCEDURE — 85652 RBC SED RATE AUTOMATED: CPT

## 2020-12-07 PROCEDURE — 83883 ASSAY NEPHELOMETRY NOT SPEC: CPT

## 2020-12-07 PROCEDURE — 86334 IMMUNOFIX E-PHORESIS SERUM: CPT

## 2020-12-07 PROCEDURE — 84165 PROTEIN E-PHORESIS SERUM: CPT

## 2020-12-07 PROCEDURE — 86038 ANTINUCLEAR ANTIBODIES: CPT

## 2020-12-07 PROCEDURE — 86140 C-REACTIVE PROTEIN: CPT

## 2020-12-07 PROCEDURE — 84520 ASSAY OF UREA NITROGEN: CPT

## 2020-12-07 PROCEDURE — 80076 HEPATIC FUNCTION PANEL: CPT

## 2020-12-07 PROCEDURE — 82565 ASSAY OF CREATININE: CPT

## 2020-12-07 PROCEDURE — 85025 COMPLETE CBC W/AUTO DIFF WBC: CPT

## 2020-12-07 PROCEDURE — 81001 URINALYSIS AUTO W/SCOPE: CPT

## 2021-12-22 ENCOUNTER — HOSPITAL ENCOUNTER (OUTPATIENT)
Dept: GENERAL RADIOLOGY | Age: 21
Discharge: HOME | End: 2021-12-22
Attending: INTERNAL MEDICINE
Payer: COMMERCIAL

## 2021-12-22 DIAGNOSIS — M25.421 SWELLING OF RIGHT ELBOW: ICD-10-CM

## 2021-12-22 DIAGNOSIS — M05.79 RHEUMATOID ARTHRITIS INVOLVING MULTIPLE SITES WITH POSITIVE RHEUMATOID FACTOR (HCC): ICD-10-CM

## 2021-12-22 DIAGNOSIS — R76.8 POSITIVE ANA (ANTINUCLEAR ANTIBODY): ICD-10-CM

## 2021-12-22 DIAGNOSIS — R53.83 OTHER FATIGUE: ICD-10-CM

## 2021-12-22 DIAGNOSIS — R70.0 ELEVATED SED RATE: ICD-10-CM

## 2021-12-22 DIAGNOSIS — M25.432 WRIST SWELLING, LEFT: ICD-10-CM

## 2021-12-22 DIAGNOSIS — Z79.899 LONG-TERM USE OF HIGH-RISK MEDICATION: ICD-10-CM

## 2021-12-22 DIAGNOSIS — L65.9 HAIR LOSS: ICD-10-CM

## 2021-12-22 PROCEDURE — 73130 X-RAY EXAM OF HAND: CPT | Performed by: INTERNAL MEDICINE

## 2021-12-22 PROCEDURE — 73110 X-RAY EXAM OF WRIST: CPT | Performed by: INTERNAL MEDICINE

## 2021-12-31 ENCOUNTER — APPOINTMENT (OUTPATIENT)
Dept: URGENT CARE | Age: 21
End: 2021-12-31

## 2021-12-31 ENCOUNTER — TELEPHONE (OUTPATIENT)
Dept: SCHEDULING | Age: 21
End: 2021-12-31

## 2022-01-06 ENCOUNTER — LAB ENCOUNTER (OUTPATIENT)
Dept: LAB | Age: 22
End: 2022-01-06
Attending: FAMILY MEDICINE
Payer: COMMERCIAL

## 2022-01-06 DIAGNOSIS — Z79.899 LONG-TERM USE OF HIGH-RISK MEDICATION: ICD-10-CM

## 2022-01-06 DIAGNOSIS — M05.79 RHEUMATOID ARTHRITIS INVOLVING MULTIPLE SITES WITH POSITIVE RHEUMATOID FACTOR (HCC): ICD-10-CM

## 2022-01-06 DIAGNOSIS — R76.8 POSITIVE ANA (ANTINUCLEAR ANTIBODY): ICD-10-CM

## 2022-01-06 LAB
ALBUMIN SERPL-MCNC: 3.5 G/DL (ref 3.4–5)
ALP LIVER SERPL-CCNC: 72 U/L
ALT SERPL-CCNC: 24 U/L
AST SERPL-CCNC: 19 U/L (ref 15–37)
BASOPHILS # BLD AUTO: 0.02 X10(3) UL (ref 0–0.2)
BASOPHILS NFR BLD AUTO: 0.3 %
BILIRUB DIRECT SERPL-MCNC: 0.1 MG/DL (ref 0–0.2)
BILIRUB SERPL-MCNC: 0.3 MG/DL (ref 0.1–2)
BUN BLD-MCNC: 19 MG/DL (ref 7–18)
C3 SERPL-MCNC: 152 MG/DL (ref 90–180)
C4 SERPL-MCNC: 29.3 MG/DL (ref 10–40)
CREAT BLD-MCNC: 0.63 MG/DL
CRP SERPL-MCNC: 1.42 MG/DL (ref ?–0.3)
EOSINOPHIL # BLD AUTO: 0.11 X10(3) UL (ref 0–0.7)
EOSINOPHIL NFR BLD AUTO: 1.5 %
ERYTHROCYTE [DISTWIDTH] IN BLOOD BY AUTOMATED COUNT: 14 %
ERYTHROCYTE [SEDIMENTATION RATE] IN BLOOD: 26 MM/HR
HCT VFR BLD AUTO: 40.4 %
HGB BLD-MCNC: 12.4 G/DL
IMM GRANULOCYTES # BLD AUTO: 0.02 X10(3) UL (ref 0–1)
IMM GRANULOCYTES NFR BLD: 0.3 %
LYMPHOCYTES # BLD AUTO: 1.85 X10(3) UL (ref 1–4)
LYMPHOCYTES NFR BLD AUTO: 25.1 %
MCH RBC QN AUTO: 26.8 PG (ref 26–34)
MCHC RBC AUTO-ENTMCNC: 30.7 G/DL (ref 31–37)
MCV RBC AUTO: 87.3 FL
MONOCYTES # BLD AUTO: 0.39 X10(3) UL (ref 0.1–1)
MONOCYTES NFR BLD AUTO: 5.3 %
NEUTROPHILS # BLD AUTO: 4.97 X10 (3) UL (ref 1.5–7.7)
NEUTROPHILS # BLD AUTO: 4.97 X10(3) UL (ref 1.5–7.7)
NEUTROPHILS NFR BLD AUTO: 67.5 %
PLATELET # BLD AUTO: 207 10(3)UL (ref 150–450)
PROT SERPL-MCNC: 7.3 G/DL (ref 6.4–8.2)
RBC # BLD AUTO: 4.63 X10(6)UL
VIT D+METAB SERPL-MCNC: 22.6 NG/ML (ref 30–100)
WBC # BLD AUTO: 7.4 X10(3) UL (ref 4–11)

## 2022-01-06 PROCEDURE — 85025 COMPLETE CBC W/AUTO DIFF WBC: CPT

## 2022-01-06 PROCEDURE — 82306 VITAMIN D 25 HYDROXY: CPT

## 2022-01-06 PROCEDURE — 36415 COLL VENOUS BLD VENIPUNCTURE: CPT

## 2022-01-06 PROCEDURE — 80076 HEPATIC FUNCTION PANEL: CPT

## 2022-01-06 PROCEDURE — 86140 C-REACTIVE PROTEIN: CPT

## 2022-01-06 PROCEDURE — 85652 RBC SED RATE AUTOMATED: CPT

## 2022-01-06 PROCEDURE — 86256 FLUORESCENT ANTIBODY TITER: CPT

## 2022-01-06 PROCEDURE — 84520 ASSAY OF UREA NITROGEN: CPT

## 2022-01-06 PROCEDURE — 86160 COMPLEMENT ANTIGEN: CPT

## 2022-01-06 PROCEDURE — 82565 ASSAY OF CREATININE: CPT

## (undated) NOTE — LETTER
Patient Name: Karla Campos  YOB: 2000          MRN number:  [id number]  Date:  3/12/2018  Referring Physician:  Leah Kaba

## (undated) NOTE — ED AVS SNAPSHOT
[facility] Emergency Department    [place]  [address] [address]    Phone:  [phone]    Fax:  [phone]           Natalie Garcia   MRN: [id number]    Department:  [facility] Emergency Department   Date of Visit:  3/18/20 Insurance plans vary and the physician(s) referred by the ER may not be covered by your plan. Please contact your insurance company to determine coverage for follow-up care and referrals.     [facility]  Main [phone]  Pediatric If the emergency physician has read X-rays, these will be re-interpreted by a radiologist.  If there is a significant change in your reading, you will be contacted. Please make sure we have your correct phone number before you leave.  After you leave, you s and ask to get set up for an insurance coverage that is in-network with [facility].         [facility]

## (undated) NOTE — LETTER
June 7, 2018      [address]  [place] [address]      Dear Elliott Gardiner: The following are the results of your recent tests ordered by Dr. Sheila Carpenter  Please review the list of test results.   Your result is the value on th Basophil % 0.1 %   Immature Granulocyte % 0.4 %           Please call if you have further questions,    Dr. Rogerio Lee

## (undated) NOTE — LETTER
Patient Name: Ami Wing  YOB: 2000          MRN number:  [id number]  Date:  3/12/2018  Referring Physician:  Angela Omalley

## (undated) NOTE — LETTER
Patient Name: Hermes Bal  YOB: 2000          MRN number:  [id number]  Date:  3/12/2018  Referring Physician:  Alfred Madera

## (undated) NOTE — ED AVS SNAPSHOT
Shawnee Cruz   MRN: [id number]    Department:  [facility] Emergency Department   Date of Visit:  2/4/2018           Disclosure     Insurance plans vary and the physician(s) referred by the ER may not be covered by your plan.  Please contact your tell this physician (or your personal doctor if your instructions are to return to your personal doctor) about any new or lasting problems. The primary care or specialist physician will see patients referred from the [facility] Emergency Department.  Cesar Calix

## (undated) NOTE — LETTER
December 8, 2020          [address]           Dear Darlin Jacob: The following are the results of your recent tests ordered by Dr. Zenia Oquendo. Please review the list of test results.   Your result is the value HGB 12.3 12.0 - 16.0 g/dL    HCT 38.8 35.0 - 48.0 %    .0 150.0 - 450.0 10(3)uL    MCV 89.4 80.0 - 100.0 fL    MCH 28.3 26.0 - 34.0 pg    MCHC 31.7 31.0 - 37.0 g/dL    RDW 14.2 11.0 - 15.0 %    RDW-SD 46.2 35.1 - 46.3 fL    Neutrophil Absolute Prel

## (undated) NOTE — LETTER
Patient Name: Margo Garcia  YOB: 2000          MRN number:  [id number]  Date:  3/12/2018  Referring Physician:  Nestor Burt       Dear Jamie Anglin,    The following is Marlee's most recent visit here in therapy.   I am just sending a letter applying posterior R arm against wall to prevent shoulder compensation. No change post treatment in A/PROM of R elbow following manual soft tissue, stretching, and joint mobility techniques.   Educated patient on importance of beginning to wear night splin

## (undated) NOTE — LETTER
[facility] [place]  [address] [facility] [address]  Dept: [phone]  Dept Fax: [phone]         June 6, 2018    Patient: Shawnee Cruz   YOB: 2000   Date of Visit: 6/6/2018       To Whom It May Concern:

## (undated) NOTE — LETTER
December 10, 2020          [place] [id number]          Dear Heidi Delacruz: The following are the results of your recent tests ordered by [address] [facility]. Please review the list of test results.   Your result RBC 4.34 3.80 - 5.30 x10(6)uL    HGB 12.3 12.0 - 16.0 g/dL    HCT 38.8 35.0 - 48.0 %    .0 150.0 - 450.0 10(3)uL    MCV 89.4 80.0 - 100.0 fL    MCH 28.3 26.0 - 34.0 pg    MCHC 31.7 31.0 - 37.0 g/dL    RDW 14.2 11.0 - 15.0 %    RDW-SD 46.2 35.1 - 46

## (undated) NOTE — LETTER
December 19, 2018          [address] [place] [address]          Dear Lottie Cushing: The following are the results of your recent tests ordered by [address] [facility]. Please review the list of test results.   Your result

## (undated) NOTE — ED AVS SNAPSHOT
[facility] Emergency Department    [place]  [address] [address]    Phone:  [phone]    Fax:  [phone]           Ivon Chacon   MRN: [id number]    Department:  [facility] Emergency Department   Date of Visit:  3/18/20 IF THERE IS ANY CHANGE OR WORSENING OF YOUR CONDITION, CALL YOUR PRIMARY CARE PHYSICIAN AT ONCE OR RETURN IMMEDIATELY TO THE EMERGENCY DEPARTMENT.     If you have been prescribed any medication(s), please fill your prescription right away and begin taking t

## (undated) NOTE — LETTER
December 4, 2018          [address]  [address] [facility] [address]          Dear Dea Shields: The following are the results of your recent tests ordered by [address] [facility]. Please review the list of test results.   Your result i

## (undated) NOTE — LETTER
November 22, 2018    Patient: Rafaela Castillo   Date of Visit: 11/22/2018       To Whom It May Concern:    Amy Flores was seen and treated in our emergency department on 11/22/2018. She should not return to work until after 11/24.     If you have any q

## (undated) NOTE — LETTER
December 19, 2018          [facility] [address] [facility] [address]          Dear Prasanna Miller: The following are the results of your recent tests ordered by [address] [facility]. Please review the list of test results.   Your result

## (undated) NOTE — ED AVS SNAPSHOT
Wilton Johnson   MRN: [id number]    Department:  [facility] Emergency Department   Date of Visit:  11/22/2018           Disclosure     Insurance plans vary and the physician(s) referred by the ER may not be covered by your plan.  Please contact you tell this physician (or your personal doctor if your instructions are to return to your personal doctor) about any new or lasting problems. The primary care or specialist physician will see patients referred from the [facility] Emergency Department.  John Rodriguez

## (undated) NOTE — LETTER
April 15, 2019          [address]  [address] [facility] [address]          Dear Elliott Gardiner: The following are the results of your recent tests ordered by [address] [facility]. Please review the list of test results.   Your result is BUN 16 7 - 18 mg/dL   CREATININE, SERUM   Result Value Ref Range    Creatinine 0.61 0.55 - 1.02 mg/dL    GFR, Non- 131 >=60    GFR, -American 151 >=60   CBC W/ DIFFERENTIAL   Result Value Ref Range    WBC 7.1 4.0 - 11.0 x10(3) uL

## (undated) NOTE — LETTER
Patient Name: Hermes Bal  YOB: 2000          MRN number:  [id number]  Date:  3/12/2018  Referring Physician: Freddie Taveras    Dear Dr. Tamara Sanders,    The following is Blevins's most recent visit here in therapy.   I am just sending a letter madeleine applying posterior R arm against wall to prevent shoulder compensation. No change post treatment in A/PROM of R elbow following manual soft tissue, stretching, and joint mobility techniques.   Educated patient on importance of beginning to wear night splin